# Patient Record
Sex: MALE | Race: WHITE
[De-identification: names, ages, dates, MRNs, and addresses within clinical notes are randomized per-mention and may not be internally consistent; named-entity substitution may affect disease eponyms.]

---

## 2017-06-14 ENCOUNTER — HOSPITAL ENCOUNTER (OUTPATIENT)
Dept: HOSPITAL 57 - BURLAB | Age: 72
Discharge: HOME | End: 2017-06-14
Payer: MEDICARE

## 2017-06-14 DIAGNOSIS — C61: Primary | ICD-10-CM

## 2017-06-14 DIAGNOSIS — R31.29: ICD-10-CM

## 2017-06-14 LAB
ALBUMIN SERPL BCG-MCNC: 4.1 G/DL (ref 3.4–4.8)
ALP SERPL-CCNC: 54 U/L (ref 40–150)
ALT SERPL W P-5'-P-CCNC: 24 U/L (ref 8–55)
ANION GAP SERPL CALC-SCNC: 13 MMOL/L (ref 10–20)
AST SERPL-CCNC: 19 U/L (ref 5–34)
BASOPHILS # BLD AUTO: 0.1 THOU/UL (ref 0–0.2)
BASOPHILS NFR BLD AUTO: 1.1 % (ref 0–1)
BILIRUB DIRECT SERPL-MCNC: 0.2 MG/DL (ref 0.1–0.3)
BILIRUB SERPL-MCNC: 0.5 MG/DL (ref 0.2–1.2)
BUN SERPL-MCNC: 14 MG/DL (ref 8.4–25.7)
CALCIUM SERPL-MCNC: 8.6 MG/DL (ref 7.8–10.44)
CHLORIDE SERPL-SCNC: 105 MMOL/L (ref 98–107)
CO2 SERPL-SCNC: 25 MMOL/L (ref 23–31)
CREAT CL PREDICTED SERPL C-G-VRATE: 0 ML/MIN (ref 70–130)
EOSINOPHIL # BLD AUTO: 0.1 THOU/UL (ref 0–0.7)
EOSINOPHIL NFR BLD AUTO: 2.3 % (ref 0–10)
GLUCOSE SERPL-MCNC: 102 MG/DL (ref 83–110)
HGB BLD-MCNC: 14.2 G/DL (ref 14–18)
HYALINE CASTS #/AREA URNS LPF: (no result) LPF
LYMPHOCYTES # BLD AUTO: 1.5 THOU/UL (ref 1.2–3.4)
LYMPHOCYTES NFR BLD AUTO: 31.3 % (ref 21–51)
MCH RBC QN AUTO: 32.7 PG (ref 27–31)
MCV RBC AUTO: 95.2 FL (ref 80–94)
MONOCYTES # BLD AUTO: 0.4 THOU/UL (ref 0.11–0.59)
MONOCYTES NFR BLD AUTO: 7.6 % (ref 0–10)
NEUTROPHILS # BLD AUTO: 2.8 THOU/UL (ref 1.4–6.5)
NEUTROPHILS NFR BLD AUTO: 57.7 % (ref 42–75)
PLATELET # BLD AUTO: 209 THOU/UL (ref 130–400)
POTASSIUM SERPL-SCNC: 4.2 MMOL/L (ref 3.5–5.1)
RBC # BLD AUTO: 4.35 MILL/UL (ref 4.7–6.1)
SODIUM SERPL-SCNC: 139 MMOL/L (ref 136–145)
SP GR UR STRIP: 1.01 (ref 1–1.03)
WBC # BLD AUTO: 4.8 THOU/UL (ref 4.8–10.8)

## 2017-06-14 PROCEDURE — 80076 HEPATIC FUNCTION PANEL: CPT

## 2017-06-14 PROCEDURE — 80048 BASIC METABOLIC PNL TOTAL CA: CPT

## 2017-06-14 PROCEDURE — 36415 COLL VENOUS BLD VENIPUNCTURE: CPT

## 2017-06-14 PROCEDURE — 87086 URINE CULTURE/COLONY COUNT: CPT

## 2017-06-14 PROCEDURE — 81001 URINALYSIS AUTO W/SCOPE: CPT

## 2017-06-14 PROCEDURE — 85025 COMPLETE CBC W/AUTO DIFF WBC: CPT

## 2017-06-14 PROCEDURE — 84153 ASSAY OF PSA TOTAL: CPT

## 2019-07-10 ENCOUNTER — HOSPITAL ENCOUNTER (EMERGENCY)
Dept: HOSPITAL 57 - BURERS | Age: 74
Discharge: HOME | End: 2019-07-10
Payer: MEDICARE

## 2019-07-10 DIAGNOSIS — E03.9: ICD-10-CM

## 2019-07-10 DIAGNOSIS — F32.9: ICD-10-CM

## 2019-07-10 DIAGNOSIS — Z79.82: ICD-10-CM

## 2019-07-10 DIAGNOSIS — Z79.899: ICD-10-CM

## 2019-07-10 DIAGNOSIS — J06.9: Primary | ICD-10-CM

## 2019-07-10 PROCEDURE — 71046 X-RAY EXAM CHEST 2 VIEWS: CPT

## 2019-07-10 NOTE — RAD
CHEST TWO VIEWS:

7/10/19

 

Comparison is made with an 11/6/13 study. 

 

An infiltrate is present in the lingula. I presume it to be pneumonia, though atelectasis is in the d
ifferential as well. The right lung is clear. There are no large effusions. The heart size is normal.


 

IMPRESSION: 

Lingular infiltrate. 

 

POS: HOME

## 2020-02-18 ENCOUNTER — HOSPITAL ENCOUNTER (EMERGENCY)
Dept: HOSPITAL 57 - BURERS | Age: 75
Discharge: HOME | End: 2020-02-18
Payer: MEDICARE

## 2020-02-18 DIAGNOSIS — E03.9: ICD-10-CM

## 2020-02-18 DIAGNOSIS — S05.02XA: Primary | ICD-10-CM
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
## 2020-02-18 DIAGNOSIS — F32.9: ICD-10-CM

## 2020-02-18 PROCEDURE — 99283 EMERGENCY DEPT VISIT LOW MDM: CPT

## 2024-12-18 ENCOUNTER — HOSPITAL ENCOUNTER (OUTPATIENT)
Dept: HOSPITAL 92 - BICULT | Age: 79
Discharge: HOME | End: 2024-12-18
Attending: UROLOGY
Payer: COMMERCIAL

## 2024-12-18 DIAGNOSIS — N28.9: Primary | ICD-10-CM

## 2024-12-18 PROCEDURE — 76770 US EXAM ABDO BACK WALL COMP: CPT
